# Patient Record
Sex: MALE | Race: WHITE | Employment: UNEMPLOYED | URBAN - NONMETROPOLITAN AREA
[De-identification: names, ages, dates, MRNs, and addresses within clinical notes are randomized per-mention and may not be internally consistent; named-entity substitution may affect disease eponyms.]

---
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
## 2025-02-10 ENCOUNTER — OFFICE VISIT (OUTPATIENT)
Dept: URGENT CARE | Age: 20
End: 2025-02-10

## 2025-02-10 VITALS
WEIGHT: 256 LBS | DIASTOLIC BLOOD PRESSURE: 74 MMHG | TEMPERATURE: 98 F | SYSTOLIC BLOOD PRESSURE: 126 MMHG | HEART RATE: 87 BPM | OXYGEN SATURATION: 96 %

## 2025-02-10 DIAGNOSIS — J02.9 SORE THROAT: ICD-10-CM

## 2025-02-10 DIAGNOSIS — J01.90 ACUTE BACTERIAL SINUSITIS: Primary | ICD-10-CM

## 2025-02-10 DIAGNOSIS — R09.82 POSTNASAL DRIP: ICD-10-CM

## 2025-02-10 DIAGNOSIS — R05.1 ACUTE COUGH: ICD-10-CM

## 2025-02-10 DIAGNOSIS — R09.81 NASAL CONGESTION: ICD-10-CM

## 2025-02-10 DIAGNOSIS — B96.89 ACUTE BACTERIAL SINUSITIS: Primary | ICD-10-CM

## 2025-02-10 RX ORDER — DEXTROMETHORPHAN HYDROBROMIDE AND PROMETHAZINE HYDROCHLORIDE 15; 6.25 MG/5ML; MG/5ML
5 SYRUP ORAL NIGHTLY PRN
Qty: 200 ML | Refills: 0 | Status: SHIPPED | OUTPATIENT
Start: 2025-02-10 | End: 2025-02-10 | Stop reason: CLARIF

## 2025-02-10 RX ORDER — DEXTROMETHORPHAN HYDROBROMIDE AND PROMETHAZINE HYDROCHLORIDE 15; 6.25 MG/5ML; MG/5ML
5 SYRUP ORAL 4 TIMES DAILY PRN
Qty: 200 ML | Refills: 0 | Status: SHIPPED | OUTPATIENT
Start: 2025-02-10

## 2025-02-10 ASSESSMENT — ENCOUNTER SYMPTOMS
NAUSEA: 0
VOMITING: 1
ABDOMINAL PAIN: 0
WHEEZING: 0
SINUS PRESSURE: 1
CHEST TIGHTNESS: 0
DIARRHEA: 0
SORE THROAT: 1
COUGH: 1
RHINORRHEA: 1
SINUS PAIN: 1
STRIDOR: 0
SHORTNESS OF BREATH: 0
VOICE CHANGE: 1

## 2025-02-10 ASSESSMENT — VISUAL ACUITY: OU: 1

## 2025-02-10 NOTE — PATIENT INSTRUCTIONS
5 days or, if unable, you can return to the clinic if symptoms persist beyond 5 days or if symptoms worsen.  If you develop shortness of breath, increased work of breathing, lightheadedness, or chest pain, contact 911, or follow up immediately with the nearest Emergency Department for further evaluation.    New Prescriptions    AMOXICILLIN-CLAVULANATE (AUGMENTIN) 875-125 MG PER TABLET    Take 1 tablet by mouth 2 times daily for 7 days    PROMETHAZINE-DEXTROMETHORPHAN (PROMETHAZINE-DM) 6.25-15 MG/5ML SYRUP    Take 5 mLs by mouth 4 times daily as needed for Cough    PSEUDOEPHEDRINE-DM-GG 60- MG TABS    Take 1 tablet by mouth 4 times daily as needed (cough and congestion)

## 2025-02-10 NOTE — PROGRESS NOTES
No wheezing, rhonchi or rales.   Chest:      Chest wall: No tenderness.   Musculoskeletal:      Cervical back: Full passive range of motion without pain, normal range of motion and neck supple. No rigidity or tenderness. No pain with movement or muscular tenderness. Normal range of motion.   Lymphadenopathy:      Head:      Right side of head: No submental, submandibular, tonsillar, preauricular, posterior auricular or occipital adenopathy.      Left side of head: No submental, submandibular, tonsillar, preauricular, posterior auricular or occipital adenopathy.      Cervical: No cervical adenopathy.   Skin:     General: Skin is warm and dry.   Neurological:      General: No focal deficit present.      Mental Status: He is alert and oriented to person, place, and time.   Psychiatric:         Behavior: Behavior is cooperative.         PROCEDURES:  Unless otherwise noted below, none     Procedures    RESULTS:  No results found for this visit on 02/10/25.      An electronic signature was used to authenticate this note.  --Fransisco Marcos PA-C      This chart was generated in part by using Dragon Dictation system and may contain errors related to that system including errors in grammar, punctuation, and spelling, as well as words and phrases that may be inappropriate. If there are any questions or concerns please feel free to contact the dictating provider for clarification.